# Patient Record
Sex: MALE | Race: WHITE | NOT HISPANIC OR LATINO | URBAN - METROPOLITAN AREA
[De-identification: names, ages, dates, MRNs, and addresses within clinical notes are randomized per-mention and may not be internally consistent; named-entity substitution may affect disease eponyms.]

---

## 2023-12-13 ENCOUNTER — OFFICE VISIT (OUTPATIENT)
Dept: UROLOGY | Facility: CLINIC | Age: 28
End: 2023-12-13
Payer: COMMERCIAL

## 2023-12-13 VITALS — HEIGHT: 75 IN | TEMPERATURE: 97.6 F

## 2023-12-13 DIAGNOSIS — Z30.09 VASECTOMY EVALUATION: Primary | ICD-10-CM

## 2023-12-13 DIAGNOSIS — F17.200 VAPING NICOTINE DEPENDENCE, NON-TOBACCO PRODUCT: ICD-10-CM

## 2023-12-13 PROCEDURE — 99203 OFFICE O/P NEW LOW 30 MIN: CPT | Performed by: UROLOGY

## 2023-12-13 PROCEDURE — 99406 BEHAV CHNG SMOKING 3-10 MIN: CPT | Performed by: UROLOGY

## 2023-12-13 RX ORDER — IBUPROFEN 200 MG
200 TABLET ORAL EVERY 6 HOURS PRN
COMMUNITY

## 2023-12-13 ASSESSMENT — PAIN SCALES - GENERAL: PAINLEVEL: 0-NO PAIN

## 2023-12-13 NOTE — PROGRESS NOTES
Subjective   Jeronimo Fox is a 28 y.o. male here for evaluation regarding elective sterilization. he is aware of alternatives to vasectomy.     Previous Children: 2  Previous scrotal surgeries? no  Any current scrotal pain? no  Taking blood thinners? No  concrete    No past medical history on file.    No past surgical history on file.       Your medication list            Accurate as of December 13, 2023  3:27 PM. If you have any questions, ask your nurse or doctor.                CONTINUE taking these medications        Instructions Last Dose Given Next Dose Due   ibuprofen 200 mg tablet                    No Known Allergies     Exam  Testicles descended bilaterally, vas palpable  Penis without lesions    #Vasectomy  Desires elective vasectomy.  In summary, the patient has a history of fecundity anxiety and would like to proceed with a vasectomy.  I had a detailed discussion with him regarding the risks, benefits and alternatives to the procedure and he would like to proceed at this time.      I discussed the following with the patient:    · Vasectomy is intended to be a permanent form of contraception.  · Vasectomy does not produce immediate sterility.  · Following vasectomy, another form of contraception is required until vas occlusion is confirmed by post- vasectomy semen analysis (PVSA).  · Even after vas occlusion is confirmed, vasectomy is not 100% reliable in preventing pregnancy.  · The risk of pregnancy after vasectomy is approximately 1 in 2,000 for men who have post-vasectomy azoospermia or PVSA showing rare non-motile sperm (RNMS).  · Repeat vasectomy is necessary in <1% of vasectomies, provided that a technique for vas occlusion known to have a low occlusive failure rate has been used.  · Patients should refrain from ejaculation for approximately one week after vasectomy.  · Options for fertility after vasectomy include vasectomy reversal and sperm retrieval with in vitro fertilization. These  options are not always successful, and they may be expensive.    Sperm banking was also offered to the patient.    He understands that he must have protected intercourse after the vasectomy until he is able to provide a negative semen sample.  He may stop using other methods of contraception when examination of one well-mixed, uncentrifuged, fresh post-vasectomy semen specimen shows azoospermia or only rare non-motile sperm (RNMS or <100,000 non-motile sperm/mL).    Will proceed with vasectomy with: Local  Sperm Banking information given per patient request : no

## 2024-01-03 ENCOUNTER — APPOINTMENT (OUTPATIENT)
Dept: UROLOGY | Facility: CLINIC | Age: 29
End: 2024-01-03
Payer: COMMERCIAL

## 2024-01-15 NOTE — PROGRESS NOTES
Patient ID: Jeronimo Fox is a 28 y.o. male.    Procedures  Pre-Procedure Diagnosis: Fecundity anxiety   Post-Procedure Diagnosis: Same   Procedure Performed: Bilateral vasectomy     Surgeon: Sapna Zaragoza MD     Assistant: Yani Colón RN    EBL: 5cc     Complications: None     Specimens: None     Anesthesia: Local        Procedure in Detail:     After obtaining informed consent, the patient was prepped and draped in the standard sterile fashion in the lower abdominal and genitalia region.       Starting on the patient's right hemiscrotum, the vas deferens was identified and isolated. Local analgesia was performed using a 50:50 mixture of 1% lidocaine and 1% bupivicaine to anesthetize the skin and the perivasal tissue.  A 1 cm longitudinal incision was made and the vas deferens was isolated with a vas clamp. The vasal tissue was incised and the vas deferens was brought out through the wound The middle segment of vas deferens was then excised and passed off of the field. The musocal was cauterized with needle tip bovie electrocautery. After obtaining adequate hemostasis, the distal vasal end was put back into the scrotum .  The proximal vasal end was then dropped back into the scrotum and the scrotum was closed with 3-0 chromic in a simple interrupted fashion.  An identical procedure was performed on the patients left side.      The wounds were then cleaned and draped with sterile telfa gauze.  Scrotal support and fluffs were applied. He tolerated the procedure well and was sent home in stable condition. I gave him strict instructions that he must have a negative semen sample before engaging in unprotected intercourse or he is at risk for achieving a pregnancy. He will follow up in 4 months.     Scribe Attestation  By signing my name below, I, Kathrin Meraz, attest that this documentation  has been prepared under the direction and in the presence of Sapna Zaragoza MD.     detailed exam PERRL/conjunctiva clear

## 2024-01-16 ENCOUNTER — PROCEDURE VISIT (OUTPATIENT)
Dept: UROLOGY | Facility: CLINIC | Age: 29
End: 2024-01-16
Payer: COMMERCIAL

## 2024-02-13 ENCOUNTER — PROCEDURE VISIT (OUTPATIENT)
Dept: UROLOGY | Facility: CLINIC | Age: 29
End: 2024-02-13
Payer: COMMERCIAL

## 2024-02-13 VITALS
SYSTOLIC BLOOD PRESSURE: 137 MMHG | HEART RATE: 82 BPM | HEIGHT: 75 IN | DIASTOLIC BLOOD PRESSURE: 79 MMHG | WEIGHT: 215 LBS | TEMPERATURE: 97.5 F | BODY MASS INDEX: 26.73 KG/M2

## 2024-02-13 DIAGNOSIS — Z30.2 ENCOUNTER FOR STERILIZATION: Primary | ICD-10-CM

## 2024-02-13 PROCEDURE — 55250 REMOVAL OF SPERM DUCT(S): CPT | Performed by: UROLOGY

## 2024-02-13 ASSESSMENT — PAIN SCALES - GENERAL: PAINLEVEL: 0-NO PAIN

## 2024-02-13 NOTE — PROGRESS NOTES
Patient ID: Jeronimo Fox is a 28 y.o. male.    Procedures  Pre-Procedure Diagnosis: Fecundity anxiety   Post-Procedure Diagnosis: Same   Procedure Performed: Bilateral vasectomy     Surgeon: Sapna Zaragoza MD   Assistant: Yani Colón RN  EBL: 5cc   Complications: None   Specimens: None   Anesthesia: Local        Procedure in Detail:     After obtaining informed consent, the patient was prepped and draped in the standard sterile fashion in the lower abdominal and genitalia region.       Starting on the patient's right hemiscrotum, the vas deferens was identified and isolated. Local analgesia was performed using a 50:50 mixture of 1% lidocaine and 1% bupivicaine to anesthetize the skin and the perivasal tissue.  A 1 cm longitudinal incision was made and the vas deferens was isolated with a vas clamp. The vasal tissue was incised and the vas deferens was brought out through the wound The middle segment of vas deferens was then excised and passed off of the field. The musocal was cauterized with needle tip bovie electrocautery. After obtaining adequate hemostasis, the distal vasal end was put back into the scrotum .  The proximal vasal end was then dropped back into the scrotum and the scrotum was closed with 3-0 chromic in a simple interrupted fashion.  An identical procedure was performed on the patients left side.      The wounds were then cleaned and draped with sterile telfa gauze.  Scrotal support and fluffs were applied. He tolerated the procedure well and was sent home in stable condition. I gave him strict instructions that he must have a negative semen sample before engaging in unprotected intercourse or he is at risk for achieving a pregnancy. He will follow up in 4 months.     Scribe Attestation  By signing my name below, I, Kathrin Meraz, attest that this documentation  has been prepared under the direction and in the presence of Sapna Zaragoza MD.

## 2024-02-21 ENCOUNTER — OFFICE VISIT (OUTPATIENT)
Dept: UROLOGY | Facility: CLINIC | Age: 29
End: 2024-02-21
Payer: COMMERCIAL

## 2024-02-21 DIAGNOSIS — Z98.52 S/P VASECTOMY: Primary | ICD-10-CM

## 2024-02-21 PROCEDURE — 99024 POSTOP FOLLOW-UP VISIT: CPT | Performed by: UROLOGY

## 2024-02-21 NOTE — PROGRESS NOTES
"HPI  Jeronimo Fox is a 27 y/o male here for a post-op visit after vasectomy. He complains of drainage.      Last visit 24:  -Bilateral vasectomy performed  -History of fecundity anxiety    This Visit:  -Took stitches out, noted oozing and stains on underwear  -No fever, chills, nausea, vomiting  - no pus draining from wound      No results found for: \"TESTOSTERONE\"  No results found for: \"LH\"  No results found for: \"FSH\"  No results found for: \"ESTRADIOL\"  No results found for: \"PSA\"  No components found for: \"CBC\"  No results found for: \"PROLACTIN\"  No results found for: \"GFRMALE\"  No results found for: \"CREATININE\"  No results found for: \"CHOL\"  No results found for: \"HDL\"  No results found for: \"CHHDL\"  No results found for: \"LDLF\"  No results found for: \"VLDL\"  No results found for: \"TRIG\"  No results found for: \"HGBA1C\"  No components found for: \"TESTOTMS\"  No results found for: \"TESTF\"  No results found for: \"17HYDROXYPRO\"  No results found for: \"FLDVS\"  No results found for: \"FLDCS\"  No results found for: \"FLDMS\"  No results found for: \"FLDPS\"  No results found for: \"FLDTS\"  No results found for: \"FLDOS\"  No results found for: \"HEAD4\"  No results found for: \"HCT\"    Current Medications:  Current Outpatient Medications   Medication Sig Dispense Refill    ibuprofen 200 mg tablet Take 1 tablet (200 mg) by mouth every 6 hours if needed for mild pain (1 - 3) or moderate pain (4 - 6).       No current facility-administered medications for this visit.        PMH:  No past medical history on file.    PSH:  No past surgical history on file.    FMH:  No family history on file.    SHx:  Social History     Tobacco Use    Smoking status: Former     Types: Cigarettes     Quit date: 2020     Years since quittin.1    Smokeless tobacco: Current    Tobacco comments:     Vapes.        Allergies:  No Known Allergies    Physical Exam   CONSTITUTIONAL:        No acute distress    HEAD:        Normocephalic and " atraumatic    CHEST / RESPIRATORY      no excess work of breathing, no respiratory distress,    ABDOMEN / GASTROINTESTINAL:        Abdomen nondistended    Testicles descended bilaterally, nontender, no masses  Vasa palpable bilaterally  Penis circ'd, no lesions, no plaques  Bilateral vasectomy healing appropriately  No sign of infection    Assessment/Plan  #Bilateral vasectomy, with wound concern  Healing well no signs of infection  -SA mae 4 months      Scribe Attestation  By signing my name below, IElena Scribe, attest that this documentation  has been prepared under the direction and in the presence of Sapna Zaragoza MD.     By signing my name below, IShavonne Scribe, attest that this documentation  has been prepared under the direction and in the presence of Sapna Zaragoza MD.

## 2024-05-07 ENCOUNTER — ANCILLARY PROCEDURE (OUTPATIENT)
Dept: ENDOCRINOLOGY | Facility: CLINIC | Age: 29
End: 2024-05-07
Payer: COMMERCIAL

## 2024-05-07 DIAGNOSIS — Z30.2 ENCOUNTER FOR STERILIZATION: ICD-10-CM

## 2024-05-07 LAB
ABSTINENCE (DAYS): 4 DAYS (ref 2–7)
AGGLUTINATION (SEMEN): NO
ANALYZED TIME:: ABNORMAL
ANDROLOGY LAB ID#: ABNORMAL
CLUMPS (SEMEN): NO
COLLECTED COMPLETELY: YES
COLLECTION LOCATION:: ABNORMAL
COLLECTION METHOD:: ABNORMAL
CONCENTRATION CN (POST-WASH): 0 MILL/ML
CONCENTRATION(SEMEN): 0 MILL/ML (ref 15–?)
DEBRIS (SEMEN): YES
NON PROG. MOTILITY (SEMEN): 0 %
NON PROG. MOTILITY CN (POST-WASH): 0 %
PROG. MOTILITY (SEMEN): 0 % (ref 32–?)
PROG. MOTILITY CN (POST-WASH): 0 %
RECEIVED TIME:: ABNORMAL
SEMEN APPEARANCE: NORMAL
SEMEN LIQUEFACTION: NORMAL
SEMEN VISCOSITY: NORMAL
TOTAL MOTILITY (SEMEN): 0 % (ref 40–?)
TOTAL MOTILITY CN (POST-WASH): 0 %
TOTAL NO OF MOTILE (SEMEN): 0 MILL
TOTAL NO OF MOTILE CN (POST-WASH): 0 MILL
TOTAL NO OF SPERM (SEMEN): 0 MILL (ref 39–?)
TOTAL NO OF SPERM CN (POST-WASH): 0 MILL
VOLUME (SEMEN): 1.2 ML (ref 1.5–?)
VOLUME CN (POST-WASH): 0.2 ML

## 2024-05-07 PROCEDURE — 89321 SEMEN ANAL SPERM DETECTION: CPT | Performed by: OBSTETRICS & GYNECOLOGY
